# Patient Record
Sex: FEMALE
[De-identification: names, ages, dates, MRNs, and addresses within clinical notes are randomized per-mention and may not be internally consistent; named-entity substitution may affect disease eponyms.]

---

## 2019-09-08 ENCOUNTER — NURSE TRIAGE (OUTPATIENT)
Dept: OTHER | Facility: CLINIC | Age: 24
End: 2019-09-08

## 2019-09-08 NOTE — TELEPHONE ENCOUNTER
Reason for Disposition   Diabetes mellitus or weak immune system (e.g., HIV positive, cancer chemo, splenectomy, organ transplant, chronic steroids)    Protocols used: URINATION PAIN - FEMALE-ADULT-AH    Pt c/o urinary frequency and burning that started today. Yesterday, she noticed her urine was dark. Pt rates the pain with urination 8/10. She also states her vaginal area itches. Pt has taken a cranberry pill and a probiotic. Pt is on her menstrual cycle making it hard to determine if there is blood in her urine from cycle or not. Denies fever. Pt reports h/o Lupus. Pt reports symptoms as documented above. Pt informed of disposition. Pt verbalizes understanding. Pt assisted with facilities located in her area and covered under her [employer] plan - pt plans to go to [facility].